# Patient Record
Sex: FEMALE | Race: WHITE | NOT HISPANIC OR LATINO | URBAN - METROPOLITAN AREA
[De-identification: names, ages, dates, MRNs, and addresses within clinical notes are randomized per-mention and may not be internally consistent; named-entity substitution may affect disease eponyms.]

---

## 2018-04-17 ENCOUNTER — NEW PATIENT (OUTPATIENT)
Dept: URBAN - METROPOLITAN AREA CLINIC 85 | Facility: CLINIC | Age: 83
End: 2018-04-17
Payer: MEDICARE

## 2018-04-17 DIAGNOSIS — H00.11 CHALAZION RIGHT UPPER LID: Primary | ICD-10-CM

## 2018-04-17 DIAGNOSIS — H25.812 COMBINED FORMS OF AGE-RELATED CATARACT, LEFT EYE: ICD-10-CM

## 2018-04-17 PROCEDURE — 92004 COMPRE OPH EXAM NEW PT 1/>: CPT | Performed by: OPHTHALMOLOGY

## 2018-04-17 ASSESSMENT — KERATOMETRY
OS: 46.00
OD: 45.88

## 2018-04-17 ASSESSMENT — VISUAL ACUITY
OS: 20/40
OD: 20/25

## 2018-04-17 ASSESSMENT — INTRAOCULAR PRESSURE
OD: 14
OS: 14

## 2021-05-25 ENCOUNTER — OFFICE VISIT (OUTPATIENT)
Dept: URBAN - METROPOLITAN AREA CLINIC 85 | Facility: CLINIC | Age: 86
End: 2021-05-25
Payer: MEDICARE

## 2021-05-25 DIAGNOSIS — H26.491 OTHER SECONDARY CATARACT, RIGHT EYE: ICD-10-CM

## 2021-05-25 DIAGNOSIS — H04.123 DRY EYE SYNDROME OF BILATERAL LACRIMAL GLANDS: Primary | ICD-10-CM

## 2021-05-25 DIAGNOSIS — H52.4 PRESBYOPIA: ICD-10-CM

## 2021-05-25 PROCEDURE — 92004 COMPRE OPH EXAM NEW PT 1/>: CPT | Performed by: OPTOMETRIST

## 2021-05-25 ASSESSMENT — INTRAOCULAR PRESSURE
OD: 14
OS: 13

## 2021-05-25 ASSESSMENT — VISUAL ACUITY
OS: 20/40
OD: 20/25

## 2021-05-25 ASSESSMENT — KERATOMETRY: OS: 45.88

## 2021-05-25 NOTE — IMPRESSION/PLAN
Impression: Dry eye syndrome of bilateral lacrimal glands: H04.123. Plan: Discussed dry eye diagnosis in detail. Recommend Systane Complete QID OU. Discussed prescription medication options such as Restasis and Coralyn Delay in the future. Patient defers prescription medication.

## 2021-05-25 NOTE — IMPRESSION/PLAN
Impression: Other secondary cataract, right eye: H26.491. Plan: The risks and benefits of YAG Capsulotomy were discussed as were post-op restrictions and likelihood of increased floaters postoperatively which may require additional treatment. The patient defers.

## 2021-05-25 NOTE — IMPRESSION/PLAN
Impression: Combined forms of age-related cataract, left eye: H25.812. Plan: Discussed findings. Discussed option of CE/IOL OU. Patient understands cataract effect on vision. Patient defers to have  CE w/IOL consult with Dr. Frandy Valencia at this time. Continue to monitor. RTC 1 year CEE or ASAP if decreased VA or pain.